# Patient Record
Sex: FEMALE | RURAL
[De-identification: names, ages, dates, MRNs, and addresses within clinical notes are randomized per-mention and may not be internally consistent; named-entity substitution may affect disease eponyms.]

---
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
## 2023-06-13 ENCOUNTER — APPOINTMENT (OUTPATIENT)
Dept: RURAL CLINIC 13 | Age: 18
Setting detail: DERMATOLOGY
End: 2023-06-14

## 2023-06-13 DIAGNOSIS — Q826 OTHER SPECIFIED ANOMALIES OF SKIN: ICD-10-CM

## 2023-06-13 DIAGNOSIS — Q819 OTHER SPECIFIED ANOMALIES OF SKIN: ICD-10-CM

## 2023-06-13 DIAGNOSIS — Q828 OTHER SPECIFIED ANOMALIES OF SKIN: ICD-10-CM

## 2023-06-13 DIAGNOSIS — L72.0 EPIDERMAL CYST: ICD-10-CM

## 2023-06-13 PROBLEM — L85.8 OTHER SPECIFIED EPIDERMAL THICKENING: Status: ACTIVE | Noted: 2023-06-13

## 2023-06-13 PROCEDURE — OTHER COUNSELING: OTHER

## 2023-06-13 PROCEDURE — 11900 INJECT SKIN LESIONS </W 7: CPT

## 2023-06-13 PROCEDURE — 99202 OFFICE O/P NEW SF 15 MIN: CPT | Mod: 25

## 2023-06-13 PROCEDURE — OTHER INTRALESIONAL KENALOG: OTHER

## 2023-06-13 PROCEDURE — OTHER MIPS QUALITY: OTHER

## 2023-06-13 ASSESSMENT — LOCATION SIMPLE DESCRIPTION DERM
LOCATION SIMPLE: RIGHT CHEEK
LOCATION SIMPLE: RIGHT ELBOW

## 2023-06-13 ASSESSMENT — LOCATION DETAILED DESCRIPTION DERM
LOCATION DETAILED: RIGHT ELBOW
LOCATION DETAILED: RIGHT MEDIAL MANDIBULAR CHEEK

## 2023-06-13 ASSESSMENT — LOCATION ZONE DERM
LOCATION ZONE: FACE
LOCATION ZONE: ARM

## 2023-06-13 NOTE — PROCEDURE: COUNSELING
Patient Specific Counseling (Will Not Stick From Patient To Patient): Jolene hernandez and [email or web address]
Detail Level: Zone

## 2023-07-31 ENCOUNTER — APPOINTMENT (OUTPATIENT)
Dept: RURAL CLINIC 13 | Age: 18
Setting detail: DERMATOLOGY
End: 2023-07-31

## 2023-07-31 DIAGNOSIS — L72.0 EPIDERMAL CYST: ICD-10-CM

## 2023-07-31 DIAGNOSIS — L70.8 OTHER ACNE: ICD-10-CM

## 2023-07-31 PROCEDURE — OTHER PRESCRIPTION: OTHER

## 2023-07-31 PROCEDURE — OTHER COUNSELING: OTHER

## 2023-07-31 PROCEDURE — OTHER MIPS QUALITY: OTHER

## 2023-07-31 PROCEDURE — 99213 OFFICE O/P EST LOW 20 MIN: CPT

## 2023-07-31 RX ORDER — TRETIONIN 0.25 MG/G
CREAM TOPICAL
Qty: 20 | Refills: 0 | Status: ERX | COMMUNITY
Start: 2023-07-31

## 2023-07-31 ASSESSMENT — LOCATION DETAILED DESCRIPTION DERM
LOCATION DETAILED: RIGHT SUPERIOR LATERAL BUCCAL CHEEK
LOCATION DETAILED: RIGHT CENTRAL MANDIBULAR CHEEK

## 2023-07-31 ASSESSMENT — LOCATION ZONE DERM: LOCATION ZONE: FACE

## 2023-07-31 ASSESSMENT — LOCATION SIMPLE DESCRIPTION DERM: LOCATION SIMPLE: RIGHT CHEEK
